# Patient Record
Sex: MALE | Race: WHITE | NOT HISPANIC OR LATINO | Employment: FULL TIME | URBAN - METROPOLITAN AREA
[De-identification: names, ages, dates, MRNs, and addresses within clinical notes are randomized per-mention and may not be internally consistent; named-entity substitution may affect disease eponyms.]

---

## 2018-06-02 ENCOUNTER — OFFICE VISIT - HEALTHEAST (OUTPATIENT)
Dept: FAMILY MEDICINE | Facility: CLINIC | Age: 28
End: 2018-06-02

## 2018-06-02 DIAGNOSIS — Z88.9 H/O SEASONAL ALLERGIES: ICD-10-CM

## 2018-06-02 DIAGNOSIS — J20.9 ACUTE BRONCHITIS: ICD-10-CM

## 2018-06-02 RX ORDER — ALBUTEROL SULFATE 90 UG/1
2 AEROSOL, METERED RESPIRATORY (INHALATION) EVERY 4 HOURS PRN
Qty: 1 EACH | Refills: 0 | Status: SHIPPED | OUTPATIENT
Start: 2018-06-02

## 2021-04-30 DIAGNOSIS — Z31.9 ENCOUNTER FOR PROCREATIVE MANAGEMENT, UNSPECIFIED: Primary | ICD-10-CM

## 2021-05-21 DIAGNOSIS — Z31.9 ENCOUNTER FOR PROCREATIVE MANAGEMENT, UNSPECIFIED: ICD-10-CM

## 2021-05-21 PROCEDURE — 89322 SEMEN ANAL STRICT CRITERIA: CPT

## 2021-05-24 LAB
ABNORMAL SPERM: 96 MORPHOLOGY
ABSTINENCE DAYS: 5 DAYS (ref 2–7)
AGGLUTINATION: NO YES/NO
ANALYSIS TEMP - CENTIGRADE: 23 CENTIGRADE
CELL FRAGMENTS: NORMAL %
COLLECTION METHOD: NORMAL
COLLECTION SITE: NORMAL
CONSENT TO RELEASE TO PARTNER: YES
HEAD DEFECT: 96
IMMATURE SPERM: NORMAL %
IMMOTILE: 40 %
LAB RECEIPT TIME: NORMAL
LIQUEFIED: YES YES/NO
MIDPIECE DEFECT: 29
NON-PROGRESSIVE MOTILITY: 4 %
NORMAL SPERM: 4 % NORMAL FORMS (ref 4–?)
PROGRESSIVE MOTILITY: 56 % (ref 32–?)
ROUND CELLS: 0.1 MILLION/ML (ref ?–2)
SPECIMEN CONCENTRATION: 34 MILLION/ML (ref 15–?)
SPECIMEN PH: 7.6 PH (ref 7.2–?)
SPECIMEN TYPE: NORMAL
SPECIMEN VOL UR: 4.7 ML (ref 1.5–?)
TAIL DEFECT: 1
TIME OF ANALYSIS: NORMAL
TOTAL NUMBER: 160 MILLION (ref 39–?)
TOTAL PROGRESSIVE MOTILE: 90 MILLION (ref 15.6–?)
VISCOUS: NO YES/NO
VITALITY: NORMAL % (ref 58–?)
WBC SPECIMEN: NORMAL %

## 2021-06-01 VITALS — WEIGHT: 131.1 LBS

## 2021-06-16 PROBLEM — Z88.9 H/O SEASONAL ALLERGIES: Status: ACTIVE | Noted: 2018-06-02

## 2023-02-01 NOTE — PROGRESS NOTES
Assessment:        Acute Bronchitis       Plan:       Antitussives per medication orders.  B-agonist inhaler.   OTC analgesics discussed  Recommended plenty of fluids  Discussed signs of worsening symptoms and when to follow-up with PCP if no symptom improvement.       Patient Instructions   You were seen today for acute bronchitis. This is likely due to a viral illness.    Symptom management:  - Get plenty of rest  - Avoid smoking and second hand smoke  - May take tylenol or ibuprofen for fever/discomfort  - Drink plenty of non-caffeinated fluids  - Tessalon perles to help suppress the cough  - Albuterol inhaler may be used every 6 hours as needed for chest tightness      Reasons to be seen in the emergency room:  - Develop a fever of 100.4 or higher  - Cough changes, coughing up blood, or become short of breath  - Neck stiffness  - Chest pain  - Severe headache  - Unable to tolerate eating or drinking fluids    Otherwise, if no symptom improvement after 1 week, follow-up with your primary care provider.      Subjective:        Darren Lima is a 27 y.o. male here for evaluation of a cough. The cough is non-productive, but with chest tightness and is worse during the day after talking a lot. He denies dyspnea, hemoptysis, and wheezing. Onset of symptoms was 1 month ago, unchanged since that time. Patient does not have a history of asthma. Patient has not had recent travel. Patient does not have a history of smoking. He denies fever, rhinorrhea, sinus congestion, ear pain, and chest pain. Patient had similar symptoms one year ago with a cough that lasted 4 months, was diagnosed with acute bronchitis, and improved with antitussives.    The following portions of the patient's history were reviewed and updated as appropriate: allergies, current medications, past medical history, past surgical history and problem list.    Review of Systems  Pertinent items are noted in HPI.     Allergies  No Known Allergies    Problem: At Risk for Falls  Goal: # Patient does not fall  Outcome: Outcome Met, Continue evaluating goal progress toward completion  Goal: # Takes action to control fall-related risks  Outcome: Outcome Met, Continue evaluating goal progress toward completion  Goal: # Verbalizes understanding of fall risk/precautions  Description: Document education using the patient education activity  Outcome: Outcome Met, Continue evaluating goal progress toward completion     Problem: VTE, Risk for  Goal: # No s/s of VTE  Outcome: Outcome Met, Continue evaluating goal progress toward completion  Goal: # Verbalizes understanding of VTE risk factors and prevention  Description: Document education using the patient education activity.   Outcome: Outcome Met, Continue evaluating goal progress toward completion  Goal: Demonstrates ability to administer injectable anticoagulants if ordered for d/c  Description: Document education using the patient education activity.  Outcome: Outcome Met, Continue evaluating goal progress toward completion     Problem: Non-Pressure Injury Wound  Goal: # No deterioration in wound  Outcome: Outcome Met, Continue evaluating goal progress toward completion  Goal: # Verbalizes understanding of wound and wound care  Description: If abnormality is a skin tear, avoid using tape on skin including transparent dressings. Document education using the patient education activity.  Outcome: Outcome Met, Continue evaluating goal progress toward completion  Goal: Participates in wound care activities  Outcome: Outcome Met, Continue evaluating goal progress toward completion  Goal: Wound care provided to promote comfort needs (Hospice)  Outcome: Outcome Met, Continue evaluating goal progress toward completion          Past Medical History  Nephrolithiasis    Past Surgical History  Appendectomy         Objective:       /62 (Patient Site: Right Arm, Patient Position: Sitting, Cuff Size: Adult Regular)  Pulse 62  Temp 97.9  F (36.6  C) (Oral)   Resp 18  Wt 131 lb 1.6 oz (59.5 kg)  SpO2 99%  General appearance: alert, appears stated age, cooperative, no distress and non-toxic  Head: Normocephalic, without obvious abnormality, atraumatic, sinuses nontender to percussion  Ears: normal TM's and external ear canals both ears  Nose: no discharge  Throat: no tonsil swelling or exudate, posterior oropharynx mildly erythematous; MMM, lips and tongue normal  Neck: mild anterior cervical adenopathy and supple, symmetrical, trachea midline  Lungs: clear to auscultation bilaterally and no rhonchi, rales, or wheezing  Heart: regular rate and rhythm, S1, S2 normal, no murmur, click, rub or gallop